# Patient Record
Sex: MALE
[De-identification: names, ages, dates, MRNs, and addresses within clinical notes are randomized per-mention and may not be internally consistent; named-entity substitution may affect disease eponyms.]

---

## 2018-03-29 ENCOUNTER — DIAGNOSTIC TRANS (OUTPATIENT)
Dept: OTHER | Age: 33
End: 2018-03-29

## 2018-03-29 ENCOUNTER — HOSPITAL (OUTPATIENT)
Dept: OTHER | Age: 33
End: 2018-03-29
Attending: EMERGENCY MEDICINE

## 2018-03-29 LAB
ANALYZER ANC (IANC): NORMAL
ANION GAP SERPL CALC-SCNC: 18 MMOL/L (ref 10–20)
BASOPHILS # BLD: 0.1 THOUSAND/MCL (ref 0–0.3)
BASOPHILS NFR BLD: 1 %
BUN SERPL-MCNC: 15 MG/DL (ref 6–20)
BUN/CREAT SERPL: 16 (ref 7–25)
CALCIUM SERPL-MCNC: 9.5 MG/DL (ref 8.4–10.2)
CHLORIDE: 103 MMOL/L (ref 98–107)
CO2 SERPL-SCNC: 24 MMOL/L (ref 21–32)
CREAT SERPL-MCNC: 0.91 MG/DL (ref 0.67–1.17)
D DIMER PPP FEU-MCNC: 0.36 MG/L FEU
DIFFERENTIAL METHOD BLD: NORMAL
EOSINOPHIL # BLD: 0.2 THOUSAND/MCL (ref 0.1–0.5)
EOSINOPHIL NFR BLD: 2 %
ERYTHROCYTE [DISTWIDTH] IN BLOOD: 12.9 % (ref 11–15)
GLUCOSE SERPL-MCNC: 123 MG/DL (ref 65–99)
HEMATOCRIT: 48.4 % (ref 39–51)
HGB BLD-MCNC: 16.5 GM/DL (ref 13–17)
LYMPHOCYTES # BLD: 3.9 THOUSAND/MCL (ref 1–4.8)
LYMPHOCYTES NFR BLD: 45 %
MCH RBC QN AUTO: 30.2 PG (ref 26–34)
MCHC RBC AUTO-ENTMCNC: 34.1 GM/DL (ref 32–36.5)
MCV RBC AUTO: 88.5 FL (ref 78–100)
MONOCYTES # BLD: 0.5 THOUSAND/MCL (ref 0.3–0.9)
MONOCYTES NFR BLD: 6 %
NEUTROPHILS # BLD: 4.1 THOUSAND/MCL (ref 1.8–7.7)
NEUTROPHILS NFR BLD: 46 %
NEUTS SEG NFR BLD: NORMAL %
PERCENT NRBC: NORMAL
PLATELET # BLD: 300 THOUSAND/MCL (ref 140–450)
POTASSIUM SERPL-SCNC: 3.6 MMOL/L (ref 3.4–5.1)
RBC # BLD: 5.47 MILLION/MCL (ref 4.5–5.9)
SODIUM SERPL-SCNC: 141 MMOL/L (ref 135–145)
TROPONIN I SERPL HS-MCNC: <0.02 NG/ML
TROPONIN I SERPL HS-MCNC: <0.02 NG/ML
WBC # BLD: 8.7 THOUSAND/MCL (ref 4.2–11)

## 2022-03-01 ENCOUNTER — HOSPITAL ENCOUNTER (EMERGENCY)
Facility: HOSPITAL | Age: 37
Discharge: HOME | End: 2022-03-01
Attending: EMERGENCY MEDICINE
Payer: COMMERCIAL

## 2022-03-01 VITALS
SYSTOLIC BLOOD PRESSURE: 139 MMHG | DIASTOLIC BLOOD PRESSURE: 96 MMHG | HEART RATE: 67 BPM | HEIGHT: 69 IN | RESPIRATION RATE: 16 BRPM | TEMPERATURE: 98 F | OXYGEN SATURATION: 100 % | BODY MASS INDEX: 28.88 KG/M2 | WEIGHT: 195 LBS

## 2022-03-01 DIAGNOSIS — S01.81XA LACERATION OF FOREHEAD, INITIAL ENCOUNTER: Primary | ICD-10-CM

## 2022-03-01 PROCEDURE — 99283 EMERGENCY DEPT VISIT LOW MDM: CPT

## 2022-03-01 PROCEDURE — 12011 RPR F/E/E/N/L/M 2.5 CM/<: CPT

## 2022-08-08 ENCOUNTER — HOSPITAL ENCOUNTER (EMERGENCY)
Facility: HOSPITAL | Age: 37
Discharge: LEFT BEFORE DISCHARGE | End: 2022-08-08
Payer: COMMERCIAL

## 2022-08-08 VITALS
HEART RATE: 58 BPM | TEMPERATURE: 98 F | RESPIRATION RATE: 20 BRPM | DIASTOLIC BLOOD PRESSURE: 80 MMHG | SYSTOLIC BLOOD PRESSURE: 135 MMHG | WEIGHT: 190 LBS | BODY MASS INDEX: 28.14 KG/M2 | HEIGHT: 69 IN | OXYGEN SATURATION: 99 %

## 2022-08-08 RX ORDER — DICYCLOMINE HCL 20 MG
20 TABLET ORAL 3 TIMES DAILY
COMMUNITY

## 2022-08-08 RX ORDER — FAMOTIDINE 20 MG/1
20 TABLET, FILM COATED ORAL 2 TIMES DAILY
COMMUNITY

## 2022-08-09 NOTE — ED INITIAL ASSESSMENT (HPI)
Pt c/o abd bloating for a week with nausea, no vomiting, c/o constipation. Last BM \"today, a little bit and hard. \" Pt denies urinary sxs